# Patient Record
Sex: FEMALE | Race: WHITE | NOT HISPANIC OR LATINO | Employment: FULL TIME | URBAN - METROPOLITAN AREA
[De-identification: names, ages, dates, MRNs, and addresses within clinical notes are randomized per-mention and may not be internally consistent; named-entity substitution may affect disease eponyms.]

---

## 2017-08-17 ENCOUNTER — HOSPITAL ENCOUNTER (OUTPATIENT)
Dept: RADIOLOGY | Facility: HOSPITAL | Age: 52
Discharge: HOME | End: 2017-08-17
Attending: NURSE PRACTITIONER
Payer: COMMERCIAL

## 2017-08-17 ENCOUNTER — OFFICE VISIT (OUTPATIENT)
Dept: FAMILY MEDICINE | Facility: CLINIC | Age: 52
End: 2017-08-17
Payer: COMMERCIAL

## 2017-08-17 VITALS
RESPIRATION RATE: 20 BRPM | BODY MASS INDEX: 21.57 KG/M2 | SYSTOLIC BLOOD PRESSURE: 154 MMHG | HEART RATE: 55 BPM | DIASTOLIC BLOOD PRESSURE: 94 MMHG | OXYGEN SATURATION: 99 % | HEIGHT: 58 IN | TEMPERATURE: 98 F | WEIGHT: 102.75 LBS

## 2017-08-17 DIAGNOSIS — M54.2 ACUTE NECK PAIN: ICD-10-CM

## 2017-08-17 DIAGNOSIS — K59.00 CONSTIPATION, UNSPECIFIED CONSTIPATION TYPE: ICD-10-CM

## 2017-08-17 DIAGNOSIS — J30.89 CHRONIC NON-SEASONAL ALLERGIC RHINITIS, UNSPECIFIED TRIGGER: ICD-10-CM

## 2017-08-17 DIAGNOSIS — Z11.59 NEED FOR HEPATITIS C SCREENING TEST: ICD-10-CM

## 2017-08-17 DIAGNOSIS — R20.8 BURNING SENSATION OF FEET: ICD-10-CM

## 2017-08-17 DIAGNOSIS — R53.83 FATIGUE, UNSPECIFIED TYPE: ICD-10-CM

## 2017-08-17 DIAGNOSIS — Z78.0 POSTMENOPAUSAL: ICD-10-CM

## 2017-08-17 DIAGNOSIS — R03.0 ELEVATED BLOOD PRESSURE READING: ICD-10-CM

## 2017-08-17 DIAGNOSIS — J34.89 NASAL SORE: ICD-10-CM

## 2017-08-17 DIAGNOSIS — E78.2 MIXED HYPERLIPIDEMIA: ICD-10-CM

## 2017-08-17 DIAGNOSIS — M54.2 ACUTE NECK PAIN: Primary | ICD-10-CM

## 2017-08-17 PROCEDURE — 72050 X-RAY EXAM NECK SPINE 4/5VWS: CPT | Mod: 26,,, | Performed by: RADIOLOGY

## 2017-08-17 PROCEDURE — 99999 PR PBB SHADOW E&M-EST. PATIENT-LVL IV: CPT | Mod: PBBFAC,,, | Performed by: NURSE PRACTITIONER

## 2017-08-17 PROCEDURE — 3008F BODY MASS INDEX DOCD: CPT | Mod: S$GLB,,, | Performed by: NURSE PRACTITIONER

## 2017-08-17 PROCEDURE — 72050 X-RAY EXAM NECK SPINE 4/5VWS: CPT | Mod: TC,PO

## 2017-08-17 PROCEDURE — 99204 OFFICE O/P NEW MOD 45 MIN: CPT | Mod: S$GLB,,, | Performed by: NURSE PRACTITIONER

## 2017-08-17 RX ORDER — ESTRADIOL 0.5 MG/1
0.5 TABLET ORAL DAILY
Refills: 3 | COMMUNITY
Start: 2017-07-14 | End: 2017-09-18

## 2017-08-17 RX ORDER — TIZANIDINE HYDROCHLORIDE 6 MG/1
6 CAPSULE, GELATIN COATED ORAL 3 TIMES DAILY PRN
Qty: 60 CAPSULE | Refills: 0 | Status: SHIPPED | OUTPATIENT
Start: 2017-08-17 | End: 2017-08-28

## 2017-08-17 RX ORDER — LORATADINE 10 MG/1
10 TABLET ORAL DAILY
COMMUNITY
End: 2018-08-07

## 2017-08-17 RX ORDER — MUPIROCIN 20 MG/G
OINTMENT TOPICAL 3 TIMES DAILY
Qty: 1 TUBE | Refills: 1 | Status: SHIPPED | OUTPATIENT
Start: 2017-08-17 | End: 2018-08-07

## 2017-08-17 NOTE — PROGRESS NOTES
Subjective:       Patient ID: Arlen Rosen is a 51 y.o. female.    Chief Complaint: Neck Pain; Constipation; Fatigue; Weight Gain; Foot Problem (itching/burning); Headache; Insomnia; Dizziness; Nose Problem (sores on and in nose-no drainage); and Lack Of Concentration    Mrs. Rosen is a new patient to family practice and myself. She presents today with multiple complaints.     Constipation: She reports last colonoscopy 3 years ago showed diverticulosis. She denies diverticulitis flares. She reports she stays very hydrated. She exercises daily. She reports metamucil makes her bloated. She has tried prune juice with relief of constipation. She has increased fiber in diet and has been having more regular BMs    Neck Pain    This is a new problem. The current episode started more than 1 month ago. The problem occurs constantly. The problem has been waxing and waning. The pain is present in the left side. The quality of the pain is described as aching. The pain is at a severity of 5/10. The pain is moderate. The symptoms are aggravated by position and twisting. The pain is worse during the day. Associated symptoms include headaches. Pertinent negatives include no chest pain, fever, numbness, pain with swallowing, paresis, tingling, trouble swallowing, visual change or weakness. She has tried ice for the symptoms. The treatment provided moderate relief.     Headache: She has suffered from migraine headaches in past however she reports she believes headache is related to neck pain, unlike typical migraine pattern. Negative for nausea/vomiting/photophobia.     Foot pain: She reports burning pain to bilateral feet every evening (plantar). She reports she has had plantar fasciiatis in past and this pain is different. She reports massage moderately relieves pain.       Postmenopausal: She reports many of her problems have started since total hysterectomy. +generalized fatigue and insomnia. She is on estrace 0.5mg daily;  she was on a higher dose and decreased due to breast tenderness.     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 Will continue to check at work. She reports she feels very overwhelmed and upset today.   Vitals:    08/17/17 1058   BP: (!) 154/94   Pulse: (!) 55   Resp: 20   Temp: 98.3 °F (36.8 °C)     Review of Systems   Constitutional: Positive for fatigue. Negative for chills, diaphoresis and fever.   HENT: Positive for rhinorrhea. Negative for facial swelling and trouble swallowing.         +nasal sores   Eyes: Negative.  Negative for discharge and redness.   Respiratory: Negative.  Negative for cough and shortness of breath.    Cardiovascular: Negative.  Negative for chest pain and palpitations.   Gastrointestinal: Positive for constipation. Negative for abdominal pain and vomiting.   Genitourinary: Negative.  Negative for difficulty urinating and flank pain.   Musculoskeletal: Positive for neck pain. Negative for back pain.   Skin: Negative.  Negative for rash and wound.   Neurological: Positive for headaches. Negative for dizziness, tingling, weakness, light-headedness and numbness.        +Burning sensation to bottom of feet (nightly)   Hematological: Negative.    Psychiatric/Behavioral: Positive for sleep disturbance. Negative for confusion. The patient is not nervous/anxious.        No past medical history on file.  Objective:      Physical Exam   Constitutional: She is oriented to person, place, and time. She appears well-developed and well-nourished.  Non-toxic appearance. She does not have a sickly appearance. She does not appear ill. No distress.   HENT:   Head: Normocephalic and atraumatic.   Right Ear: Hearing, tympanic membrane, external ear and ear canal normal.   Left Ear: Hearing, tympanic membrane, external ear and ear canal normal.   Nose: Rhinorrhea present.   Mouth/Throat: Uvula is midline, oropharynx is clear and moist  and mucous membranes are normal.   Eyes: Conjunctivae, EOM and lids are normal. Right eye exhibits no discharge. Left eye exhibits no discharge.   Neck: Trachea normal and normal range of motion. Neck supple. No JVD present. Muscular tenderness present. No spinous process tenderness present. No neck rigidity. No tracheal deviation, no edema, no erythema and normal range of motion present.   Cardiovascular: Normal rate, regular rhythm, S1 normal, S2 normal and normal heart sounds.   No extrasystoles are present. Exam reveals no gallop, no S3, no S4, no distant heart sounds and no friction rub.    No murmur heard.  Pulses:       Dorsalis pedis pulses are 2+ on the right side, and 2+ on the left side.        Posterior tibial pulses are 2+ on the right side, and 2+ on the left side.   Negative for lower extremity edema   Pulmonary/Chest: Effort normal and breath sounds normal. No accessory muscle usage. No respiratory distress. She has no decreased breath sounds. She has no wheezes. She has no rhonchi. She has no rales. She exhibits no tenderness.   Abdominal: Soft. Normal appearance and bowel sounds are normal. She exhibits no distension. There is no hepatosplenomegaly. There is no tenderness. No hernia.   Musculoskeletal: Normal range of motion. She exhibits no edema or deformity.        Cervical back: She exhibits pain. She exhibits no swelling, no edema, no deformity and no laceration.   Feet:   Right Foot:   Skin Integrity: Negative for ulcer, blister, skin breakdown, erythema, warmth, callus or dry skin.   Left Foot:   Skin Integrity: Negative for ulcer, blister, skin breakdown, erythema, warmth, callus or dry skin.   Neurological: She is alert and oriented to person, place, and time. No sensory deficit. Coordination and gait normal.   Skin: Skin is warm, dry and intact. She is not diaphoretic. No pallor.   Psychiatric: She has a normal mood and affect. Her speech is normal and behavior is normal. Judgment and  thought content normal. Cognition and memory are normal.   Nursing note and vitals reviewed.      Assessment:       1. Acute neck pain    2. Need for hepatitis C screening test    3. Fatigue, unspecified type    4. Mixed hyperlipidemia    5. Postmenopausal    6. Burning sensation of feet    7. Elevated blood pressure reading    8. Chronic non-seasonal allergic rhinitis, unspecified trigger    9. Nasal sore        Plan:       Acute neck pain  -     X-Ray Cervical Spine Complete 5 view; Future; Expected date: 08/17/2017  -     Ambulatory referral to Gynecology  -     tizanidine (ZANAFLEX) 6 mg capsule; Take 1 capsule (6 mg total) by mouth 3 (three) times daily as needed for Muscle spasms.  Dispense: 60 capsule; Refill: 0    Need for hepatitis C screening test  -     Hepatitis C antibody; Future; Expected date: 08/17/2017    Fatigue, unspecified type  -     TSH; Future; Expected date: 08/17/2017  -     T4, free; Future; Expected date: 08/17/2017    Mixed hyperlipidemia  -     Lipid panel; Future; Expected date: 08/17/2017    Postmenopausal  -     Vitamin D; Future; Expected date: 08/17/2017    Burning sensation of feet  -     Comprehensive metabolic panel; Future; Expected date: 08/17/2017  -     Hemoglobin A1c; Future; Expected date: 08/17/2017    Elevated blood pressure reading  -     CBC auto differential; Future; Expected date: 08/17/2017    Chronic non-seasonal allergic rhinitis, unspecified trigger  Nasal sore  -     mupirocin (BACTROBAN) 2 % ointment; Apply topically 3 (three) times daily.  Dispense: 1 Tube; Refill: 1  - otc flonase, claritin     Constipation   Continue diet modifications as now; push fluids, high fibers, regular exercise    Explained to patient I believe a lot of her symptoms seem hormonal related; Will refer to gynecology for specialized treatment plan.     Xray today, labs tomorrow AM fasting       Follow up to establish care   Return if symptoms worsen or fail to improve.

## 2017-08-18 ENCOUNTER — LAB VISIT (OUTPATIENT)
Dept: LAB | Facility: HOSPITAL | Age: 52
End: 2017-08-18
Attending: NURSE PRACTITIONER
Payer: COMMERCIAL

## 2017-08-18 ENCOUNTER — TELEPHONE (OUTPATIENT)
Dept: FAMILY MEDICINE | Facility: CLINIC | Age: 52
End: 2017-08-18

## 2017-08-18 DIAGNOSIS — Z78.0 POSTMENOPAUSAL: ICD-10-CM

## 2017-08-18 DIAGNOSIS — M48.02 FORAMINAL STENOSIS OF CERVICAL REGION: Primary | ICD-10-CM

## 2017-08-18 DIAGNOSIS — R53.83 FATIGUE, UNSPECIFIED TYPE: ICD-10-CM

## 2017-08-18 DIAGNOSIS — R03.0 ELEVATED BLOOD PRESSURE READING: ICD-10-CM

## 2017-08-18 DIAGNOSIS — E78.2 MIXED HYPERLIPIDEMIA: ICD-10-CM

## 2017-08-18 DIAGNOSIS — R20.8 BURNING SENSATION OF FEET: ICD-10-CM

## 2017-08-18 DIAGNOSIS — Z11.59 NEED FOR HEPATITIS C SCREENING TEST: ICD-10-CM

## 2017-08-18 LAB
25(OH)D3+25(OH)D2 SERPL-MCNC: 27 NG/ML
ALBUMIN SERPL BCP-MCNC: 3.9 G/DL
ALP SERPL-CCNC: 71 U/L
ALT SERPL W/O P-5'-P-CCNC: 16 U/L
ANION GAP SERPL CALC-SCNC: 10 MMOL/L
AST SERPL-CCNC: 21 U/L
BASOPHILS # BLD AUTO: 0.02 K/UL
BASOPHILS NFR BLD: 0.3 %
BILIRUB SERPL-MCNC: 0.9 MG/DL
BUN SERPL-MCNC: 11 MG/DL
CALCIUM SERPL-MCNC: 9.9 MG/DL
CHLORIDE SERPL-SCNC: 105 MMOL/L
CHOLEST/HDLC SERPL: 4.2 {RATIO}
CO2 SERPL-SCNC: 25 MMOL/L
CREAT SERPL-MCNC: 0.8 MG/DL
DIFFERENTIAL METHOD: NORMAL
EOSINOPHIL # BLD AUTO: 0.1 K/UL
EOSINOPHIL NFR BLD: 2 %
ERYTHROCYTE [DISTWIDTH] IN BLOOD BY AUTOMATED COUNT: 12.5 %
EST. GFR  (AFRICAN AMERICAN): >60 ML/MIN/1.73 M^2
EST. GFR  (NON AFRICAN AMERICAN): >60 ML/MIN/1.73 M^2
ESTIMATED AVG GLUCOSE: 91 MG/DL
GLUCOSE SERPL-MCNC: 86 MG/DL
HBA1C MFR BLD HPLC: 4.8 %
HCT VFR BLD AUTO: 41.6 %
HCV AB SERPL QL IA: NEGATIVE
HDL/CHOLESTEROL RATIO: 23.9 %
HDLC SERPL-MCNC: 243 MG/DL
HDLC SERPL-MCNC: 58 MG/DL
HGB BLD-MCNC: 14.4 G/DL
LDLC SERPL CALC-MCNC: 152.2 MG/DL
LYMPHOCYTES # BLD AUTO: 3 K/UL
LYMPHOCYTES NFR BLD: 45 %
MCH RBC QN AUTO: 30.9 PG
MCHC RBC AUTO-ENTMCNC: 34.6 G/DL
MCV RBC AUTO: 89 FL
MONOCYTES # BLD AUTO: 0.4 K/UL
MONOCYTES NFR BLD: 5.7 %
NEUTROPHILS # BLD AUTO: 3.1 K/UL
NEUTROPHILS NFR BLD: 46.8 %
NONHDLC SERPL-MCNC: 185 MG/DL
PLATELET # BLD AUTO: 172 K/UL
PMV BLD AUTO: 12.8 FL
POTASSIUM SERPL-SCNC: 4.3 MMOL/L
PROT SERPL-MCNC: 7.1 G/DL
RBC # BLD AUTO: 4.66 M/UL
SODIUM SERPL-SCNC: 140 MMOL/L
T4 FREE SERPL-MCNC: 0.99 NG/DL
TRIGL SERPL-MCNC: 164 MG/DL
TSH SERPL DL<=0.005 MIU/L-ACNC: 1.88 UIU/ML
WBC # BLD AUTO: 6.62 K/UL

## 2017-08-18 PROCEDURE — 85025 COMPLETE CBC W/AUTO DIFF WBC: CPT

## 2017-08-18 PROCEDURE — 80061 LIPID PANEL: CPT

## 2017-08-18 PROCEDURE — 84439 ASSAY OF FREE THYROXINE: CPT

## 2017-08-18 PROCEDURE — 84443 ASSAY THYROID STIM HORMONE: CPT

## 2017-08-18 PROCEDURE — 36415 COLL VENOUS BLD VENIPUNCTURE: CPT | Mod: PO

## 2017-08-18 PROCEDURE — 86803 HEPATITIS C AB TEST: CPT

## 2017-08-18 PROCEDURE — 83036 HEMOGLOBIN GLYCOSYLATED A1C: CPT

## 2017-08-18 PROCEDURE — 82306 VITAMIN D 25 HYDROXY: CPT

## 2017-08-18 PROCEDURE — 80053 COMPREHEN METABOLIC PANEL: CPT

## 2017-08-28 ENCOUNTER — INITIAL CONSULT (OUTPATIENT)
Dept: OBSTETRICS AND GYNECOLOGY | Facility: CLINIC | Age: 52
End: 2017-08-28
Payer: COMMERCIAL

## 2017-08-28 VITALS
DIASTOLIC BLOOD PRESSURE: 60 MMHG | HEIGHT: 58 IN | WEIGHT: 102.31 LBS | SYSTOLIC BLOOD PRESSURE: 100 MMHG | BODY MASS INDEX: 21.48 KG/M2

## 2017-08-28 DIAGNOSIS — N95.1 MENOPAUSAL SYMPTOMS: ICD-10-CM

## 2017-08-28 DIAGNOSIS — N39.3 SUI (STRESS URINARY INCONTINENCE, FEMALE): Primary | ICD-10-CM

## 2017-08-28 PROCEDURE — 3008F BODY MASS INDEX DOCD: CPT | Mod: S$GLB,,, | Performed by: OBSTETRICS & GYNECOLOGY

## 2017-08-28 PROCEDURE — 99999 PR PBB SHADOW E&M-EST. PATIENT-LVL III: CPT | Mod: PBBFAC,,, | Performed by: OBSTETRICS & GYNECOLOGY

## 2017-08-28 PROCEDURE — 99203 OFFICE O/P NEW LOW 30 MIN: CPT | Mod: S$GLB,,, | Performed by: OBSTETRICS & GYNECOLOGY

## 2017-08-28 RX ORDER — PROGESTERONE 100 MG/1
100 CAPSULE ORAL NIGHTLY
Qty: 30 CAPSULE | Refills: 5 | Status: SHIPPED | OUTPATIENT
Start: 2017-08-28 | End: 2018-08-07

## 2017-08-28 RX ORDER — ESTERIFIED ESTROGEN AND METHYLTESTOSTERONE .625; 1.25 MG/1; MG/1
1 TABLET ORAL DAILY
Qty: 30 TABLET | Refills: 5 | Status: SHIPPED | OUTPATIENT
Start: 2017-08-28 | End: 2021-01-21

## 2017-08-28 RX ORDER — CHOLECALCIFEROL (VITAMIN D3) 25 MCG
2000 TABLET ORAL DAILY
COMMUNITY
End: 2018-08-07

## 2017-08-28 RX ORDER — CLOBETASOL PROPIONATE 0.5 MG/G
CREAM TOPICAL 2 TIMES DAILY
COMMUNITY

## 2017-08-28 NOTE — LETTER
August 28, 2017      Alice Haas NP  [address] [facility] [address] [place] [address]           [facility] [address]  [facility] [address]  Phone: [phone]  Fax: [phone]          Patient: Arlen Rosen   MR Number: [id number]   YOB: 1965   Date of Visit: 8/28/2017       Dear Alice Haas:    Thank you for referring Arlen Rosen to me for evaluation. Attached you will find relevant portions of my assessment and plan of care.    If you have questions, please do not hesitate to call me. I look forward to following Arlen Rosen along with you.    Sincerely,    Andre Valentin MD    Enclosure  CC:  No Recipients    If you would like to receive this communication electronically, please contact [email or web address] or [phone] to request more information on [facility] Link access.    For providers and/or their staff who would like to refer a patient to [facility], please contact us through our one-stop-shop provider referral line, [facility], at [phone].    If you feel you have received this communication in error or would no longer like to receive these types of communications, please e-mail [email or web address]

## 2017-08-28 NOTE — PROGRESS NOTES
Patient here today 3 years after hysterectomy with BSO, estrace .5 qd, continued menopausal symptoms but unable to tolerate higher estrogen dose because of breast soreness. Counseled on Risks, Benefits and Alternatives to progesterone and estratest , discused with patient in detail, all questions answered and patient agreed to proceed.   also stress urinary incontinence - daily problem, counseled - kegals and estrogen    15 minutes spent with patient with > 1/2 time in counseling.      Plan:  1. estratest HS, prometrium 100 qhs  2. Follow up 2-3 weeks to asses response  3. TVT at her convenience

## 2017-09-18 ENCOUNTER — OFFICE VISIT (OUTPATIENT)
Dept: OBSTETRICS AND GYNECOLOGY | Facility: CLINIC | Age: 52
End: 2017-09-18
Payer: COMMERCIAL

## 2017-09-18 VITALS — BODY MASS INDEX: 21.61 KG/M2 | HEIGHT: 58 IN | WEIGHT: 102.94 LBS

## 2017-09-18 DIAGNOSIS — N39.3 SUI (STRESS URINARY INCONTINENCE, FEMALE): Primary | ICD-10-CM

## 2017-09-18 DIAGNOSIS — N95.1 MENOPAUSAL SYMPTOMS: ICD-10-CM

## 2017-09-18 PROCEDURE — 99213 OFFICE O/P EST LOW 20 MIN: CPT | Mod: S$GLB,,, | Performed by: OBSTETRICS & GYNECOLOGY

## 2017-09-18 PROCEDURE — 99999 PR PBB SHADOW E&M-EST. PATIENT-LVL II: CPT | Mod: PBBFAC,,, | Performed by: OBSTETRICS & GYNECOLOGY

## 2017-09-18 PROCEDURE — 3008F BODY MASS INDEX DOCD: CPT | Mod: S$GLB,,, | Performed by: OBSTETRICS & GYNECOLOGY

## 2017-09-18 NOTE — PROGRESS NOTES
Here for meds follow up, reports allergic reaction from oral flagyl- PAP with BV from primary care- counseled.     LAST VISIT:   3 years after hysterectomy with BSO, estrace .5 qd, continued menopausal symptoms but unable to tolerate higher estrogen dose because of breast soreness. Counseled on Risks, Benefits and Alternatives to progesterone and estratest , discused with patient in detail, all questions answered and patient agreed to proceed.   also stress urinary incontinence - daily problem, counseled - kegals and estrogen      She reports symptoms from menopause completely improved on meds, sleeping well.     15 minutes spent with patient with > 1/2 time in counseling.       Plan:  Continue estratest HS, prometrium 100 qhs, follow up as needed for cystometrics and to schedule TVT at her convenience

## 2018-08-07 ENCOUNTER — LAB VISIT (OUTPATIENT)
Dept: LAB | Facility: HOSPITAL | Age: 53
End: 2018-08-07
Attending: NURSE PRACTITIONER
Payer: COMMERCIAL

## 2018-08-07 ENCOUNTER — OFFICE VISIT (OUTPATIENT)
Dept: FAMILY MEDICINE | Facility: CLINIC | Age: 53
End: 2018-08-07
Payer: COMMERCIAL

## 2018-08-07 VITALS
SYSTOLIC BLOOD PRESSURE: 124 MMHG | HEART RATE: 55 BPM | OXYGEN SATURATION: 97 % | BODY MASS INDEX: 21.15 KG/M2 | HEIGHT: 58 IN | WEIGHT: 100.75 LBS | TEMPERATURE: 98 F | DIASTOLIC BLOOD PRESSURE: 68 MMHG

## 2018-08-07 DIAGNOSIS — K85.90 ACUTE PANCREATITIS WITHOUT INFECTION OR NECROSIS, UNSPECIFIED PANCREATITIS TYPE: ICD-10-CM

## 2018-08-07 DIAGNOSIS — Z09 HOSPITAL DISCHARGE FOLLOW-UP: Primary | ICD-10-CM

## 2018-08-07 LAB
ALBUMIN SERPL BCP-MCNC: 3.9 G/DL
ALP SERPL-CCNC: 97 U/L
ALT SERPL W/O P-5'-P-CCNC: 18 U/L
AMYLASE SERPL-CCNC: 57 U/L
ANION GAP SERPL CALC-SCNC: 8 MMOL/L
AST SERPL-CCNC: 17 U/L
BILIRUB SERPL-MCNC: 0.5 MG/DL
BUN SERPL-MCNC: 12 MG/DL
CALCIUM SERPL-MCNC: 10.8 MG/DL
CHLORIDE SERPL-SCNC: 99 MMOL/L
CO2 SERPL-SCNC: 31 MMOL/L
CREAT SERPL-MCNC: 0.8 MG/DL
EST. GFR  (AFRICAN AMERICAN): >60 ML/MIN/1.73 M^2
EST. GFR  (NON AFRICAN AMERICAN): >60 ML/MIN/1.73 M^2
GLUCOSE SERPL-MCNC: 109 MG/DL
LIPASE SERPL-CCNC: 79 U/L
POTASSIUM SERPL-SCNC: 3.6 MMOL/L
PROT SERPL-MCNC: 7.8 G/DL
SODIUM SERPL-SCNC: 138 MMOL/L

## 2018-08-07 PROCEDURE — 99999 PR PBB SHADOW E&M-EST. PATIENT-LVL IV: CPT | Mod: PBBFAC,,, | Performed by: NURSE PRACTITIONER

## 2018-08-07 PROCEDURE — 82150 ASSAY OF AMYLASE: CPT

## 2018-08-07 PROCEDURE — 80053 COMPREHEN METABOLIC PANEL: CPT

## 2018-08-07 PROCEDURE — 99214 OFFICE O/P EST MOD 30 MIN: CPT | Mod: S$GLB,,, | Performed by: NURSE PRACTITIONER

## 2018-08-07 PROCEDURE — 83690 ASSAY OF LIPASE: CPT

## 2018-08-07 PROCEDURE — 36415 COLL VENOUS BLD VENIPUNCTURE: CPT | Mod: PO

## 2018-08-07 RX ORDER — AZELASTINE 1 MG/ML
SPRAY, METERED NASAL
COMMUNITY
Start: 2018-07-12

## 2018-08-07 RX ORDER — OMEPRAZOLE 20 MG/1
20 CAPSULE, DELAYED RELEASE ORAL DAILY
COMMUNITY
Start: 2018-08-04 | End: 2021-01-21

## 2018-08-07 RX ORDER — FLUTICASONE PROPIONATE 50 MCG
SPRAY, SUSPENSION (ML) NASAL
COMMUNITY
Start: 2018-07-14

## 2018-08-07 RX ORDER — MINERAL OIL
180 ENEMA (ML) RECTAL DAILY
COMMUNITY
Start: 2018-07-14

## 2018-08-07 NOTE — PROGRESS NOTES
Subjective:       Patient ID: Arlen Rosen is a 52 y.o. female.    Chief Complaint: Hospital Follow Up ([facility] )    Ms. Rosen is a known patient to me. She presents today for hospital follow up    HPI   Admitted to [facility] on for acute pancreatitis with associated ileus. Records reviewed-- She was placed on bowel rest and given IVF. Diet slowly advised. Workup for pancreatitis (US abdomen and lipid panel) unremarkable--antibody test for autoimmune pancreatitis sent off (pending at time of DC). Advised to FU with GI and follow low fat diet.  DCd home 8/4/18/    She denies any change in diet. Denies excessive alcohol intake. Eats low fat/clean diet. Does report change in bowel habit prior to pancreatitis episode (gassy/diarrhea). 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   Vitals:    08/07/18 1634   BP: 124/68   Pulse: (!) 55   Temp: 98.1 °F (36.7 °C)     Review of Systems   Constitutional: Negative for diaphoresis and fever.   HENT: Negative for facial swelling and trouble swallowing.    Eyes: Negative for discharge and redness.   Respiratory: Negative for cough and shortness of breath.    Cardiovascular: Negative for chest pain and palpitations.   Gastrointestinal: Positive for abdominal pain, constipation and diarrhea.   Genitourinary: Negative for difficulty urinating.   Musculoskeletal: Negative for gait problem.   Skin: Negative for rash and wound.   Neurological: Positive for headaches. Negative for facial asymmetry and speech difficulty.   Psychiatric/Behavioral: Negative for confusion. The patient is not nervous/anxious.        Past Medical History:   Diagnosis Date    Lichen sclerosus et atrophicus of the vulva      Objective:      Physical Exam   Constitutional: She is oriented to person, place, and time. She does not have a sickly appearance. No distress.   HENT:   Head: Normocephalic.   Right Ear: Hearing normal.   Left Ear: Hearing normal.   Nose: Nose normal.   Eyes: Conjunctivae and lids are  normal.   Neck: No JVD present. No tracheal deviation present.   Cardiovascular: Normal rate and normal heart sounds.    Pulmonary/Chest: Effort normal and breath sounds normal.   Abdominal: Normal appearance. She exhibits distension. There is no tenderness.   Musculoskeletal: She exhibits no deformity.   Neurological: She is alert and oriented to person, place, and time.   Skin: She is not diaphoretic. No pallor.   Psychiatric: She has a normal mood and affect. Her speech is normal and behavior is normal. Judgment and thought content normal. Cognition and memory are normal.   Nursing note and vitals reviewed.      Assessment:       1. Hospital discharge follow-up    2. Acute pancreatitis without infection or necrosis, unspecified pancreatitis type        Plan:       Hospital discharge follow-up    Acute pancreatitis without infection or necrosis, unspecified pancreatitis type  -     Amylase; Future; Expected date: 08/07/2018  -     Lipase; Future; Expected date: 08/07/2018  -     Comprehensive metabolic panel; Future; Expected date: 08/07/2018  -     Ambulatory referral to Gastroenterology  - Low fat diet    Transitional Care Note    Family and/or Caretaker present at visit?  No  Diagnostic tests reviewed/disposition: yes; repeat labs, FU c GI  Disease/illness education: low fat diet  Home health/community services discussion/referrals: NA  Establishment or re-establishment of referral orders for community resources: NA  Discussion with other health care providers: FU c GI    Follow-up for GI evaluation, with me as needed, PCP routinely .

## 2018-08-07 NOTE — LETTER
August 7, 2018      [facility]  [address] [facility] [address] [place] [address]  Phone: [phone]  Fax: [phone]       Patient: Arlen Rosen   YOB: 1965  Date of Visit: 08/07/2018    To Whom It May Concern:    Marcelino Rosen  was at [facility] on 08/07/2018. She may return to work on 8/11/18 with no restrictions.  If you have any questions or concerns, or if I can be of further assistance, please do not hesitate to contact me.    Sincerely,    Alice Harden NP

## 2018-08-19 ENCOUNTER — PATIENT MESSAGE (OUTPATIENT)
Dept: GASTROENTEROLOGY | Facility: CLINIC | Age: 53
End: 2018-08-19

## 2018-08-20 ENCOUNTER — INITIAL CONSULT (OUTPATIENT)
Dept: GASTROENTEROLOGY | Facility: CLINIC | Age: 53
End: 2018-08-20
Payer: COMMERCIAL

## 2018-08-20 ENCOUNTER — TELEPHONE (OUTPATIENT)
Dept: GASTROENTEROLOGY | Facility: CLINIC | Age: 53
End: 2018-08-20

## 2018-08-20 ENCOUNTER — HOSPITAL ENCOUNTER (OUTPATIENT)
Dept: RADIOLOGY | Facility: HOSPITAL | Age: 53
Discharge: HOME | End: 2018-08-20
Attending: NURSE PRACTITIONER
Payer: COMMERCIAL

## 2018-08-20 VITALS
BODY MASS INDEX: 20.82 KG/M2 | HEIGHT: 58 IN | HEART RATE: 59 BPM | RESPIRATION RATE: 18 BRPM | WEIGHT: 99.19 LBS | SYSTOLIC BLOOD PRESSURE: 119 MMHG | DIASTOLIC BLOOD PRESSURE: 73 MMHG

## 2018-08-20 DIAGNOSIS — R19.5 CHANGE IN STOOL CALIBER: ICD-10-CM

## 2018-08-20 DIAGNOSIS — R14.0 ABDOMINAL BLOATING: ICD-10-CM

## 2018-08-20 DIAGNOSIS — Z80.0 FAMILY HISTORY OF COLON CANCER: ICD-10-CM

## 2018-08-20 DIAGNOSIS — R10.30 LOWER ABDOMINAL PAIN: ICD-10-CM

## 2018-08-20 DIAGNOSIS — Z87.19 HISTORY OF PANCREATITIS: ICD-10-CM

## 2018-08-20 DIAGNOSIS — Z87.19 HISTORY OF ILEUS: ICD-10-CM

## 2018-08-20 DIAGNOSIS — Z87.19 HISTORY OF CHRONIC CONSTIPATION: ICD-10-CM

## 2018-08-20 DIAGNOSIS — Z87.42 HISTORY OF ENDOMETRIOSIS: ICD-10-CM

## 2018-08-20 DIAGNOSIS — R10.30 LOWER ABDOMINAL PAIN: Primary | ICD-10-CM

## 2018-08-20 PROCEDURE — 99243 OFF/OP CNSLTJ NEW/EST LOW 30: CPT | Mod: S$GLB,,, | Performed by: NURSE PRACTITIONER

## 2018-08-20 PROCEDURE — 99999 PR PBB SHADOW E&M-EST. PATIENT-LVL IV: CPT | Mod: PBBFAC,,, | Performed by: NURSE PRACTITIONER

## 2018-08-20 PROCEDURE — 74019 RADEX ABDOMEN 2 VIEWS: CPT | Mod: 26,,, | Performed by: RADIOLOGY

## 2018-08-20 PROCEDURE — 74019 RADEX ABDOMEN 2 VIEWS: CPT | Mod: TC,FY,PO

## 2018-08-20 NOTE — LETTER
August 20, 2018      Alice Harden, JORGE  [address] [facility] [place] [address]           [facility]  [address] [facility] [place] [address]  Phone: [phone]          Patient: Arlen Rosen   MR Number: [id number]   YOB: 1965   Date of Visit: 8/20/2018       Dear Alice Harden:    Thank you for referring Arlen Rosen to me for evaluation. Attached you will find relevant portions of my assessment and plan of care.    If you have questions, please do not hesitate to call me. I look forward to following Arlen Rosen along with you.    Sincerely,    Jesusita Cash, [facility]    Enclosure  CC:  No Recipients    If you would like to receive this communication electronically, please contact [email or web address] or [phone] to request more information on [employer] Link access.    For providers and/or their staff who would like to refer a patient to [facility], please contact us through our one-stop-shop provider referral line, [facility], at [phone].    If you feel you have received this communication in error or would no longer like to receive these types of communications, please e-mail [email or web address]

## 2018-08-20 NOTE — PROGRESS NOTES
"Subjective:       Patient ID: Arlen Rosen is a 52 y.o. female Body mass index is 20.73 kg/m².    Chief Complaint: Hospital Follow Up (pancreatitis)    This patient is new to me.  Referring Provider: FABY Harden NP for acute pancreatitis    Reviewed 8/7/18 visit note with FABY Harden NP: "HPI   Admitted to [facility] on for acute pancreatitis with associated ileus. Records reviewed-- She was placed on bowel rest and given IVF. Diet slowly advised. Workup for pancreatitis (US abdomen and lipid panel) unremarkable--antibody test for autoimmune pancreatitis sent off (pending at time of DC). Advised to FU with GI and follow low fat diet.  DCd home 8/4/18/  She denies any change in diet. Denies excessive alcohol intake. Eats low fat/clean diet. Does report change in bowel habit prior to pancreatitis episode (gassy/diarrhea). 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 "      Abdominal Pain   This is a new problem. Episode onset: started with diarrhea episodes lasting 4 hours in late 7/2018; prior week went to the beach (denies any alcohol use), felt "terrible after her beach trip"; started with chest pain & abdominal pain 8/1/18. The problem occurs 2 to 4 times per day (mostly in the morning). The most recent episode lasted 1 hour. Progression since onset: improved after hospitalization; but still having intermittent abdominal pain. The pain is located in the generalized abdominal region (now mostly lower abdomen). The pain is at a severity of 3/10. Quality: bloating. Associated symptoms include belching (frequent), constipation (chronic; bowel movements 2-4 times a week) and flatus (frequent). Pertinent negatives include no diarrhea (has resolved), dysuria, fever, frequency, hematochezia, melena, nausea, vomiting or weight loss. Associated symptoms comments: Narrow stool when symptoms first started. The pain is relieved by passing flatus, belching and bowel movements. She has tried proton pump inhibitors " (prilosec 20 mg once daily; denies alcohol use; denies any new medication; PAST: zantac helped) for the symptoms. Prior diagnostic workup includes CT scan, GI consult and ultrasound. Her past medical history is significant for abdominal surgery, GERD (denies any symptoms currently) and pancreatitis. There is no history of Crohn's disease, gallstones, irritable bowel syndrome, PUD or ulcerative colitis.     Review of Systems   Constitutional: Negative for appetite change (back to normal; eating low fat diet), chills, fatigue, fever and weight loss.   HENT: Negative for sore throat and trouble swallowing.    Respiratory: Negative for cough, choking and shortness of breath.    Cardiovascular: Negative for chest pain.   Gastrointestinal: Positive for abdominal pain, constipation (chronic; bowel movements 2-4 times a week) and flatus (frequent). Negative for anal bleeding, blood in stool, diarrhea (has resolved), hematochezia, melena, nausea, rectal pain and vomiting.   Genitourinary: Negative for difficulty urinating, dysuria, flank pain and frequency.   Neurological: Negative for weakness.       Past Medical History:   Diagnosis Date    [facility] pancreat[facility]    Lichen sclerosus et atrophicus of the vulva      Past Surgical History:   Procedure Laterality Date    COLONOSCOPY  2014    Dr. Shaver, normal findings per patient report    HYSTERECTOMY  07/2013    laparoscope      endometriosis    OOPHORECTOMY  07/2013    UPPER GASTROINTESTINAL ENDOSCOPY  05/25/2006    Dr. Haro, in legacy: unremarkable findings     Family History   Problem Relation Age of Onset    Breast cancer Maternal Grandmother 70    Colon polyps Brother     Colon cancer Paternal Grandfather     Crohn's disease Neg Hx     Ulcerative colitis Neg Hx     Stomach cancer Neg Hx     Esophageal cancer Neg Hx     Pancreatic cancer Neg Hx     Pancreatitis Neg Hx      Social History     Socioeconomic History    Marital status:       Spouse name: Not on file    Number of children: Not on file    Years of education: Not on file    Highest education level: Not on file   Social Needs    Financial resource strain: Not on file    Food insecurity - worry: Not on file    Food insecurity - inability: Not on file    Transportation needs - medical: Not on file    Transportation needs - non-medical: Not on file   Occupational History    Not on file   Tobacco Use    Smoking status: Never Smoker    Smokeless tobacco: Never Used   Substance and Sexual Activity    Alcohol use: Yes     Comment: just socially about once a month    Drug use: No    Sexual activity: Not on file   Other Topics Concern    Not on file   Social History Narrative    Not on file     Wt Readings from Last 10 Encounters:   08/20/18 45 kg (99 lb 3.3 oz)   08/07/18 45.7 kg (100 lb 12 oz)   09/18/17 46.7 kg (102 lb 15.3 oz)   08/28/17 46.4 kg (102 lb 4.7 oz)   08/17/17 46.6 kg (102 lb 11.8 oz)   08/03/17 43.1 kg (95 lb)   10/08/10 43.2 kg (95 lb 4.2 oz)     Lab Results   Component Value Date    WBC 6.62 08/18/2017    HGB 14.4 08/18/2017    HCT 41.6 08/18/2017    MCV 89 08/18/2017     08/18/2017     CMP  Sodium   Date Value Ref Range Status   08/07/2018 138 136 - 145 mmol/L Final     Potassium   Date Value Ref Range Status   08/07/2018 3.6 3.5 - 5.1 mmol/L Final     Chloride   Date Value Ref Range Status   08/07/2018 99 95 - 110 mmol/L Final     CO2   Date Value Ref Range Status   08/07/2018 31 (H) 23 - 29 mmol/L Final     Glucose   Date Value Ref Range Status   08/07/2018 109 70 - 110 mg/dL Final     BUN, Bld   Date Value Ref Range Status   08/07/2018 12 6 - 20 mg/dL Final     Creatinine   Date Value Ref Range Status   08/07/2018 0.8 0.5 - 1.4 mg/dL Final     Calcium   Date Value Ref Range Status   08/07/2018 10.8 (H) 8.7 - 10.5 mg/dL Final     Total Protein   Date Value Ref Range Status   08/07/2018 7.8 6.0 - 8.4 g/dL Final     Albumin   Date Value Ref  "Range Status   08/07/2018 3.9 3.5 - 5.2 g/dL Final     Total Bilirubin   Date Value Ref Range Status   08/07/2018 0.5 0.1 - 1.0 mg/dL Final     Comment:     For infants and newborns, interpretation of results should be based  on gestational age, weight and in agreement with clinical  observations.  Premature Infant recommended reference ranges:  Up to 24 hours.............<8.0 mg/dL  Up to 48 hours............<12.0 mg/dL  3-5 days..................<15.0 mg/dL  6-29 days.................<15.0 mg/dL       Alkaline Phosphatase   Date Value Ref Range Status   08/07/2018 97 55 - 135 U/L Final     AST   Date Value Ref Range Status   08/07/2018 17 10 - 40 U/L Final     ALT   Date Value Ref Range Status   08/07/2018 18 10 - 44 U/L Final     Anion Gap   Date Value Ref Range Status   08/07/2018 8 8 - 16 mmol/L Final     eGFR if    Date Value Ref Range Status   08/07/2018 >60.0 >60 mL/min/1.73 m^2 Final     eGFR if non    Date Value Ref Range Status   08/07/2018 >60.0 >60 mL/min/1.73 m^2 Final     Comment:     Calculation used to obtain the estimated glomerular filtration  rate (eGFR) is the CKD-EPI equation.        Lab Results   Component Value Date    AMYLASE 57 08/07/2018     Lab Results   Component Value Date    LIPASE 79 (H) 08/07/2018   "Notes recorded by Alice Harden NP on 8/8/2018 at 7:27 AM CDT  Please call and tell her lipase greatly improved (from >[phone] to [phone]). FU with GI as scheduled"    Lab Results   Component Value Date    TSH 1.882 08/18/2017     Reviewed medical records received from patient, summarized below and in medical history, & given to nurse to be scanned into system:   8/1/18 ct abdomen pelvis: "impression: acute pancreatitis without necrosis, abscess, pseudocyst. Gaseous distension of stomach and small bowel, likely a mild ileus." "there is no pancreatic or biliary ductal dilatation."  8/2/18 abdominal ultrasound "impression: edema in this pancreas and free " "fluid in the left and right anterior pararenal space could represent acute pancreatitis. No evidence of gallstones[.] right renal subcentimeter angiomyolipoma"    Objective:      Physical Exam   Constitutional: She is oriented to person, place, and time. She appears well-developed and well-nourished. No distress.   HENT:   Mouth/Throat: Oropharynx is clear and moist and mucous membranes are normal. No oral lesions. No oropharyngeal exudate.   Eyes: Conjunctivae are normal. Pupils are equal, round, and reactive to light. No scleral icterus.   Cardiovascular: Normal rate.   Pulmonary/Chest: Effort normal and breath sounds normal. No respiratory distress. She has no wheezes.   Abdominal: Bowel sounds are normal. She exhibits distension (mild). She exhibits no abdominal bruit and no mass. There is no hepatosplenomegaly. There is tenderness (mild) in the left lower quadrant. There is no rigidity, no rebound, no guarding, no tenderness at McBurney's point and negative Presley's sign. No hernia.   Neurological: She is alert and oriented to person, place, and time.   Skin: Skin is warm and dry. No rash noted. She is not diaphoretic. No erythema. No pallor.   Non-jaundiced   Psychiatric: She has a normal mood and affect. Her behavior is normal. Judgment and thought content normal.   Nursing note and vitals reviewed.      Assessment:       1. Lower abdominal pain    2. History of pancreatitis    3. History of endometriosis    4. History of chronic constipation    5. Family history of colon cancer    6. Change in stool caliber    7. History of ileus    8. Abdominal bloating        Plan:     Patient agreed to sign medical records release consent for us to obtain records from [facility]    Lower abdominal pain  -     X-Ray Abdomen Flat And Erect; Future; Expected date: 08/20/2018  -     Ambulatory referral to General Surgery  - schedule Colonoscopy to be done in 6-8 weeks, discussed procedure with the patient, patient verbalized " understanding    History of pancreatitis  -     Lipase; Future; Expected date: 08/20/2018  -     X-Ray Abdomen Flat And Erect; Future; Expected date: 08/20/2018  -     Ambulatory referral to General Surgery  - Possible EUS pending results of testing and if symptoms persist; will discuss case with Dr. Castorena and see what his recommendations are; patient verbalized understanding and agreed with management plan.    History of endometriosis  Recommend follow-up with GYN for continued evaluation and management.    History of chronic constipation  -     X-Ray Abdomen Flat And Erect; Future; Expected date: 08/20/2018  - schedule Colonoscopy to be done in 6-8 weeks, discussed procedure with the patient, patient verbalized understanding  Recommended daily exercise as tolerated, adequate water intake (six 8-oz glasses of water daily), and recommend trying OTC MiraLax once daily (17g PO) as directed pending results of x-ray  -If still no improvement with these measures, call/follow-up    Family history of colon cancer & Change in stool caliber  -     X-Ray Abdomen Flat And Erect; Future; Expected date: 08/20/2018  - schedule Colonoscopy to be done in 6-8 weeks, discussed procedure with the patient, patient verbalized understanding    History of ileus  -     X-Ray Abdomen Flat And Erect; Future; Expected date: 08/20/2018  - schedule Colonoscopy to be done in 6-8 weeks, discussed procedure with the patient, patient verbalized understanding    Abdominal bloating  -     X-Ray Abdomen Flat And Erect; Future; Expected date: 08/20/2018  - schedule Colonoscopy to be done in 6-8 weeks, discussed procedure with the patient, patient verbalized understanding  -discussed with patient about low gas diet: Reduce or eliminate these foods from your diet: Broccoli, Cauliflower, Callender sprouts, Cabbage, Cooked dried beans, Carbonated beverages (sparkling water, soda, beer, champagne)  Other Causes Of Excess Gas Include:   1) EATING TOO FAST or  TALKING WHILE YOU CHEW may cause you to swallow air. This increases the amount of gas in the stomach and may worsen your symptoms.  --> Chew each mouthful completely before swallowing. Take your time.  2) OVEREATING may increase the feeling of being bloated and cause more gas.  --> When you are full, stop eating.  3) CONSTIPATION can increase the amount of normal intestinal gas.    Follow-up in about 1 month (around 9/20/2018), or if symptoms worsen or fail to improve.      If no improvement in symptoms or symptoms worsen, call/follow-up at clinic or go to ER.

## 2018-08-20 NOTE — Clinical Note
Andrés Castorena,I saw this patient today. Just wanted to forward the progress note for your review and to see if you have any further recommendationsThJesusita de jesus

## 2018-08-20 NOTE — PATIENT INSTRUCTIONS
Discharge Instructions for Acute Pancreatitis  You have been diagnosed with acute pancreatitis. Your pancreas is inflamed or swollen. The pancreas is an organ that makes digestive juices and hormones. Gallstones are a common cause of pancreatitis. These hard stones form in the gallbladder. The gallbladder shares a tube with the pancreas into the small intestine. If gallstones block this tube, fluid cant leave the pancreas. The fluid backs up and causes redness and swelling (inflammation). There are other causes of pancreatitis. Make sure you understand the cause of your pancreatitis. Then you can try to stop it from happening again.  Immediate home care  · Find someone to drive you to appointments. Acute pancreatitis is a serious condition, and you should never drive if you are experiencing symptoms.  · Stop drinking if your illness was caused by alcohol.  ¨ Ask your healthcare provider about alcohol abuse programs and support groups such as Alcoholics Anonymous.  ¨ Ask your provider about prescription medicines that can help you stop drinking.  ¨ Tell your provider about the alcohol withdrawal symptoms you have when you stop drinking. This is very important. You may need close medical supervision and special medicines when you stop drinking. This will depend on your alcohol withdrawal history.   · Take your medicines exactly as directed. Dont skip doses.  · Eat a low-fat diet. Ask your provider for menus and other diet information.  · Learn to take your own pulse. Keep a record of your results. Ask your provider which readings mean that you need medical attention.  Ongoing care  · Tell your provider about any medicines you are taking. Some medicines can cause this condition.  · Before starting any new medicine, ask your provider if it will harm your pancreas. This includes any new over-the-counter medicines, vitamins, or herbal supplements.    · Tell your provider if you lose weight without dieting.  · Be aware  of symptoms that may mean your pancreatitis has come back. These symptoms include belly pain, nausea and vomiting, and fever.  · Keep all follow-up appointments with your provider. Problems can often show up later.  Follow-up  Follow up with your healthcare provider, or as advised.  When to call your provider  Call your healthcare provider right away if you have any of the following:  · Fever of 100.4°F (38.0°C) or higher, or as advised by your provider  · Severe pain from your upper belly to your back  · Nausea and vomiting  · Feely dizzy or lightheaded  · Yellowing of your skin or eyes (jaundice)  · Bruises on your belly or back  · Belly swelling and tenderness  · Rapid pulse  · Shallow, fast breathing   Date Last Reviewed: 8/1/2016  © 5633-3957 [employer]. [address] [place] [address], [place] [address]. All rights reserved. This information is not intended as a substitute for professional medical care. Always follow your healthcare professional's instructions.

## 2018-08-21 ENCOUNTER — TREATMENT PLANNING (OUTPATIENT)
Dept: GASTROENTEROLOGY | Facility: CLINIC | Age: 53
End: 2018-08-21

## 2018-08-21 DIAGNOSIS — Z87.19 HISTORY OF PANCREATITIS: Primary | ICD-10-CM

## 2018-08-21 NOTE — Clinical Note
Please inform patient that I received her prior medical records and discussed her case with Dr. Castorena. Dr. Castorena & I recommend getting an MRCP done for further evaluation and then get EUS. Please schedule patient for MRCP to be done prior to EUS.Please verify that the patient does not have a pacemaker/defibrillator, metal implant, cerebral aneurysm or surgical clip, pump, middle or inner ear prosthetic, or nerve or brain stimulator, if so, please notify me so I can adjust the order accordingly. Please ask if patient is claustrophobic, if so, let me know.ThanksKTP

## 2018-08-21 NOTE — PROGRESS NOTES
Reviewed medical records received from [facility], summarized below and in medical & surgical history (endoscopies, etc), copies made & given to nurse to be scanned into system:   Reviewed discharge summary (discharged on 8/4/18) for acute pancreatitis and associated ileus.  Reviewed consult with GI: Dr. Feliz from 8/2/18 (unclear etiology: no gallstones, denies history of alcohol use, no new medications or supplements, triglycerides normal)  Labs reviewed: LFTs were normal throughout all lab results   8/1/18 lipase 67416 (H)   8/2/18 1210 (H), triglycerides 124 WNL   8/3/18 lipase 93 (H)   8/4/18 lipase 30 WNL, amylase 73 WNL  8/1/18 CT scan & 8/2/18 abdominal ultrasound reviewed (same result that was received at patient visit)  Recommendations:  Discussed case with Dr. Castorena. He recommended completing an MRCP and then scheduling EUS to further evaluate pancreas. Orders placed.  Thanks  [facility]

## 2018-08-23 ENCOUNTER — TELEPHONE (OUTPATIENT)
Dept: GASTROENTEROLOGY | Facility: CLINIC | Age: 53
End: 2018-08-23

## 2018-08-23 NOTE — TELEPHONE ENCOUNTER
----- Message from Jason Thomas MD sent at 8/23/2018  9:55 AM CDT -----  Direct EUS is fine. Can be a 30min case. Can be done at [facility] or [place].    ----- Message -----  From: Paulina Larkin MA  Sent: 8/22/2018  10:33 AM  To: Jason Thomas MD    Order was placed for EUS. Please review and advise

## 2018-08-24 ENCOUNTER — TELEPHONE (OUTPATIENT)
Dept: GASTROENTEROLOGY | Facility: CLINIC | Age: 53
End: 2018-08-24

## 2018-08-24 NOTE — TELEPHONE ENCOUNTER
----- Message from Dia Jimenez LPN sent at 8/24/2018 12:01 PM CDT -----  Contact: Pt   Pt does not want the EUS, please cancel. Wants to seek trmnt elsewhere.  ----- Message -----  From: Papo Nieto MA  Sent: 8/24/2018  11:54 AM  To: Raffaele LEMUS Jr. Staff    Pt  Called and donot want the Eus.      Pt can be reached at [phone].    Thanks

## 2018-08-24 NOTE — TELEPHONE ENCOUNTER
----- Message from JIN Darby sent at 8/21/2018  9:40 AM CDT -----  Please inform patient that I received her prior medical records and discussed her case with Dr. Castorena. Dr. Castorena & I recommend getting an MRCP done for further evaluation and then get EUS. Please schedule patient for MRCP to be done prior to EUS.  Please verify that the patient does not have a pacemaker/defibrillator, metal implant, cerebral aneurysm or surgical clip, pump, middle or inner ear prosthetic, or nerve or brain stimulator, if so, please notify me so I can adjust the order accordingly. Please ask if patient is claustrophobic, if so, let me know.    Thanks  MINOO

## 2018-08-24 NOTE — TELEPHONE ENCOUNTER
Spoke with patient. EUS scheduled for 9/17 at 11a. Reviewed prep instructions. Ms Huerta verbalized understanding.

## 2018-08-24 NOTE — TELEPHONE ENCOUNTER
CORRECTION TO TELEPHONE NOTE OF 8/24:Spoke with patient. EUS scheduled for 9/17 at 11a. Reviewed prep instructions. Ms HOLLIDAY verbalized understanding.

## 2018-08-26 ENCOUNTER — TELEPHONE (OUTPATIENT)
Dept: GASTROENTEROLOGY | Facility: CLINIC | Age: 53
End: 2018-08-26

## 2018-08-26 NOTE — TELEPHONE ENCOUNTER
""The associated case was canceled: Patient Refused (Comment Required) (Does not want. Will seek care elsewhere)"    Can we have patient relations contact patient to see if they can help the patient with anything?  Thanks,  KTP  "

## 2018-08-26 NOTE — TELEPHONE ENCOUNTER
"----- Message from Paulina Larkin MA sent at 8/24/2018 12:09 PM CDT -----  Regarding: Cancellation of Order # [id number]  Order number [id number] for the procedure ENDOSCOPIC ULTRASOUND   (UPPER) [GI43] has been canceled by Paulina Larkin MA [[id number]].   This procedure was ordered by JIN Darby [[id number]]   on Aug 21, 2018 for the patient Arlen Rosen [[id number]]. The   reason for cancellation was "None".    This was a future order.  "

## 2021-01-21 ENCOUNTER — OFFICE VISIT (OUTPATIENT)
Dept: FAMILY MEDICINE | Facility: CLINIC | Age: 56
End: 2021-01-21
Payer: COMMERCIAL

## 2021-01-21 VITALS
SYSTOLIC BLOOD PRESSURE: 116 MMHG | HEIGHT: 58 IN | HEART RATE: 61 BPM | BODY MASS INDEX: 21.7 KG/M2 | WEIGHT: 103.38 LBS | DIASTOLIC BLOOD PRESSURE: 78 MMHG

## 2021-01-21 DIAGNOSIS — M50.30 DDD (DEGENERATIVE DISC DISEASE), CERVICAL: Primary | ICD-10-CM

## 2021-01-21 DIAGNOSIS — M47.812 FACET ARTHRITIS, DEGENERATIVE, CERVICAL SPINE: ICD-10-CM

## 2021-01-21 DIAGNOSIS — M79.2 RADICULAR PAIN IN LEFT ARM: ICD-10-CM

## 2021-01-21 DIAGNOSIS — Z00.00 WELLNESS EXAMINATION: ICD-10-CM

## 2021-01-21 DIAGNOSIS — E78.2 MIXED HYPERLIPIDEMIA: ICD-10-CM

## 2021-01-21 DIAGNOSIS — M83.9 VITAMIN D DEFICIENT OSTEOMALACIA: ICD-10-CM

## 2021-01-21 DIAGNOSIS — E04.1 THYROID NODULE: ICD-10-CM

## 2021-01-21 PROCEDURE — 99214 OFFICE O/P EST MOD 30 MIN: CPT | Mod: S$GLB,,, | Performed by: INTERNAL MEDICINE

## 2021-01-21 PROCEDURE — 99999 PR PBB SHADOW E&M-EST. PATIENT-LVL III: ICD-10-PCS | Mod: PBBFAC,,, | Performed by: INTERNAL MEDICINE

## 2021-01-21 PROCEDURE — 99999 PR PBB SHADOW E&M-EST. PATIENT-LVL III: CPT | Mod: PBBFAC,,, | Performed by: INTERNAL MEDICINE

## 2021-01-21 PROCEDURE — 1125F PR PAIN SEVERITY QUANTIFIED, PAIN PRESENT: ICD-10-PCS | Mod: S$GLB,,, | Performed by: INTERNAL MEDICINE

## 2021-01-21 PROCEDURE — 99214 PR OFFICE/OUTPT VISIT, EST, LEVL IV, 30-39 MIN: ICD-10-PCS | Mod: S$GLB,,, | Performed by: INTERNAL MEDICINE

## 2021-01-21 PROCEDURE — 1125F AMNT PAIN NOTED PAIN PRSNT: CPT | Mod: S$GLB,,, | Performed by: INTERNAL MEDICINE

## 2021-01-21 PROCEDURE — 3008F PR BODY MASS INDEX (BMI) DOCUMENTED: ICD-10-PCS | Mod: CPTII,S$GLB,, | Performed by: INTERNAL MEDICINE

## 2021-01-21 PROCEDURE — 3008F BODY MASS INDEX DOCD: CPT | Mod: CPTII,S$GLB,, | Performed by: INTERNAL MEDICINE

## 2021-01-21 RX ORDER — PREDNISONE 10 MG/1
10 TABLET ORAL 2 TIMES DAILY
Qty: 10 TABLET | Refills: 0 | Status: SHIPPED | OUTPATIENT
Start: 2021-01-21 | End: 2021-01-26

## 2021-01-21 RX ORDER — ESTRADIOL 0.5 MG/1
0.5 TABLET ORAL DAILY
COMMUNITY
Start: 2021-01-13

## 2021-01-21 RX ORDER — CYCLOBENZAPRINE HCL 10 MG
10 TABLET ORAL NIGHTLY
Qty: 30 TABLET | Refills: 1 | Status: SHIPPED | OUTPATIENT
Start: 2021-01-21 | End: 2021-01-31

## 2021-01-29 LAB
25(OH)D3 SERPL-MCNC: 34 NG/ML (ref 30–100)
ALBUMIN SERPL-MCNC: 4.3 G/DL (ref 3.6–5.1)
ALBUMIN/GLOB SERPL: 2 (CALC) (ref 1–2.5)
ALP SERPL-CCNC: 72 U/L (ref 37–153)
ALT SERPL-CCNC: 13 U/L (ref 6–29)
AST SERPL-CCNC: 16 U/L (ref 10–35)
BILIRUB SERPL-MCNC: 0.9 MG/DL (ref 0.2–1.2)
BUN SERPL-MCNC: 16 MG/DL (ref 7–25)
BUN/CREAT SERPL: NORMAL (CALC) (ref 6–22)
CALCIUM SERPL-MCNC: 9.6 MG/DL (ref 8.6–10.4)
CHLORIDE SERPL-SCNC: 101 MMOL/L (ref 98–110)
CHOLEST SERPL-MCNC: 229 MG/DL
CHOLEST/HDLC SERPL: 3.8 (CALC)
CO2 SERPL-SCNC: 29 MMOL/L (ref 20–32)
CREAT SERPL-MCNC: 0.78 MG/DL (ref 0.5–1.05)
GFRSERPLBLD MDRD-ARVRAT: 86 ML/MIN/1.73M2
GLOBULIN SER CALC-MCNC: 2.2 G/DL (CALC) (ref 1.9–3.7)
GLUCOSE SERPL-MCNC: 73 MG/DL (ref 65–99)
HDLC SERPL-MCNC: 60 MG/DL
LDLC SERPL CALC-MCNC: 129 MG/DL (CALC)
NONHDLC SERPL-MCNC: 169 MG/DL (CALC)
POTASSIUM SERPL-SCNC: 3.9 MMOL/L (ref 3.5–5.3)
PROT SERPL-MCNC: 6.5 G/DL (ref 6.1–8.1)
SODIUM SERPL-SCNC: 140 MMOL/L (ref 135–146)
TRIGL SERPL-MCNC: 259 MG/DL

## 2021-07-22 ENCOUNTER — OFFICE VISIT (OUTPATIENT)
Dept: FAMILY MEDICINE | Facility: CLINIC | Age: 56
End: 2021-07-22
Payer: COMMERCIAL

## 2021-07-22 VITALS
DIASTOLIC BLOOD PRESSURE: 76 MMHG | BODY MASS INDEX: 20.41 KG/M2 | HEIGHT: 58 IN | WEIGHT: 97.25 LBS | SYSTOLIC BLOOD PRESSURE: 114 MMHG

## 2021-07-22 DIAGNOSIS — M48.02 CERVICAL STENOSIS OF SPINE: ICD-10-CM

## 2021-07-22 DIAGNOSIS — M50.30 DDD (DEGENERATIVE DISC DISEASE), CERVICAL: ICD-10-CM

## 2021-07-22 DIAGNOSIS — M79.2 RADICULAR PAIN IN LEFT ARM: ICD-10-CM

## 2021-07-22 DIAGNOSIS — M54.10 RADICULAR PAIN OF UPPER EXTREMITY: ICD-10-CM

## 2021-07-22 DIAGNOSIS — Z87.19 HISTORY OF PANCREATITIS: ICD-10-CM

## 2021-07-22 DIAGNOSIS — M54.12 RADICULOPATHY, CERVICAL REGION: ICD-10-CM

## 2021-07-22 DIAGNOSIS — E78.2 MIXED HYPERLIPIDEMIA: ICD-10-CM

## 2021-07-22 DIAGNOSIS — E55.9 VITAMIN D DEFICIENCY: ICD-10-CM

## 2021-07-22 DIAGNOSIS — Z00.00 WELLNESS EXAMINATION: Primary | ICD-10-CM

## 2021-07-22 PROCEDURE — 99396 PR PREVENTIVE VISIT,EST,40-64: ICD-10-PCS | Mod: 25,S$GLB,, | Performed by: INTERNAL MEDICINE

## 2021-07-22 PROCEDURE — 1159F MED LIST DOCD IN RCRD: CPT | Mod: CPTII,S$GLB,, | Performed by: INTERNAL MEDICINE

## 2021-07-22 PROCEDURE — 1126F PR PAIN SEVERITY QUANTIFIED, NO PAIN PRESENT: ICD-10-PCS | Mod: CPTII,S$GLB,, | Performed by: INTERNAL MEDICINE

## 2021-07-22 PROCEDURE — 90715 TDAP VACCINE 7 YRS/> IM: CPT | Mod: S$GLB,,, | Performed by: INTERNAL MEDICINE

## 2021-07-22 PROCEDURE — 99999 PR PBB SHADOW E&M-EST. PATIENT-LVL IV: ICD-10-PCS | Mod: PBBFAC,,, | Performed by: INTERNAL MEDICINE

## 2021-07-22 PROCEDURE — 1160F PR REVIEW ALL MEDS BY PRESCRIBER/CLIN PHARMACIST DOCUMENTED: ICD-10-PCS | Mod: CPTII,S$GLB,, | Performed by: INTERNAL MEDICINE

## 2021-07-22 PROCEDURE — 99999 PR PBB SHADOW E&M-EST. PATIENT-LVL IV: CPT | Mod: PBBFAC,,, | Performed by: INTERNAL MEDICINE

## 2021-07-22 PROCEDURE — 90715 TDAP VACCINE GREATER THAN OR EQUAL TO 7YO IM: ICD-10-PCS | Mod: S$GLB,,, | Performed by: INTERNAL MEDICINE

## 2021-07-22 PROCEDURE — 3008F PR BODY MASS INDEX (BMI) DOCUMENTED: ICD-10-PCS | Mod: CPTII,S$GLB,, | Performed by: INTERNAL MEDICINE

## 2021-07-22 PROCEDURE — 3074F PR MOST RECENT SYSTOLIC BLOOD PRESSURE < 130 MM HG: ICD-10-PCS | Mod: CPTII,S$GLB,, | Performed by: INTERNAL MEDICINE

## 2021-07-22 PROCEDURE — 3078F DIAST BP <80 MM HG: CPT | Mod: CPTII,S$GLB,, | Performed by: INTERNAL MEDICINE

## 2021-07-22 PROCEDURE — 1160F RVW MEDS BY RX/DR IN RCRD: CPT | Mod: CPTII,S$GLB,, | Performed by: INTERNAL MEDICINE

## 2021-07-22 PROCEDURE — 99396 PREV VISIT EST AGE 40-64: CPT | Mod: 25,S$GLB,, | Performed by: INTERNAL MEDICINE

## 2021-07-22 PROCEDURE — 3078F PR MOST RECENT DIASTOLIC BLOOD PRESSURE < 80 MM HG: ICD-10-PCS | Mod: CPTII,S$GLB,, | Performed by: INTERNAL MEDICINE

## 2021-07-22 PROCEDURE — 1159F PR MEDICATION LIST DOCUMENTED IN MEDICAL RECORD: ICD-10-PCS | Mod: CPTII,S$GLB,, | Performed by: INTERNAL MEDICINE

## 2021-07-22 PROCEDURE — 90471 IMMUNIZATION ADMIN: CPT | Mod: S$GLB,,, | Performed by: INTERNAL MEDICINE

## 2021-07-22 PROCEDURE — 90471 TDAP VACCINE GREATER THAN OR EQUAL TO 7YO IM: ICD-10-PCS | Mod: S$GLB,,, | Performed by: INTERNAL MEDICINE

## 2021-07-22 PROCEDURE — 3008F BODY MASS INDEX DOCD: CPT | Mod: CPTII,S$GLB,, | Performed by: INTERNAL MEDICINE

## 2021-07-22 PROCEDURE — 3074F SYST BP LT 130 MM HG: CPT | Mod: CPTII,S$GLB,, | Performed by: INTERNAL MEDICINE

## 2021-07-22 PROCEDURE — 1126F AMNT PAIN NOTED NONE PRSNT: CPT | Mod: CPTII,S$GLB,, | Performed by: INTERNAL MEDICINE

## 2021-07-22 RX ORDER — VALACYCLOVIR HYDROCHLORIDE 1 G/1
TABLET, FILM COATED ORAL
COMMUNITY
Start: 2021-04-30

## 2021-07-22 RX ORDER — ONDANSETRON HYDROCHLORIDE 8 MG/1
8 TABLET, FILM COATED ORAL 4 TIMES DAILY PRN
Qty: 30 TABLET | Refills: 0 | Status: SHIPPED | OUTPATIENT
Start: 2021-07-22

## 2021-07-22 RX ORDER — ONDANSETRON HYDROCHLORIDE 8 MG/1
TABLET, FILM COATED ORAL 4 TIMES DAILY PRN
COMMUNITY
Start: 2021-03-08 | End: 2021-07-22 | Stop reason: SDUPTHER

## 2021-07-31 PROBLEM — E78.2 MIXED HYPERLIPIDEMIA: Status: ACTIVE | Noted: 2021-07-31

## 2021-07-31 PROBLEM — E55.9 VITAMIN D DEFICIENCY: Status: ACTIVE | Noted: 2021-07-31

## 2021-08-04 LAB
25(OH)D3 SERPL-MCNC: 35 NG/ML (ref 30–100)
ALBUMIN SERPL-MCNC: 4.4 G/DL (ref 3.6–5.1)
ALBUMIN/GLOB SERPL: 1.8 (CALC) (ref 1–2.5)
ALP SERPL-CCNC: 72 U/L (ref 37–153)
ALT SERPL-CCNC: 13 U/L (ref 6–29)
AMYLASE SERPL-CCNC: 41 U/L (ref 21–101)
AST SERPL-CCNC: 19 U/L (ref 10–35)
BASOPHILS # BLD AUTO: 31 CELLS/UL (ref 0–200)
BASOPHILS NFR BLD AUTO: 0.5 %
BILIRUB SERPL-MCNC: 0.7 MG/DL (ref 0.2–1.2)
BUN SERPL-MCNC: 15 MG/DL (ref 7–25)
BUN/CREAT SERPL: NORMAL (CALC) (ref 6–22)
CALCIUM SERPL-MCNC: 10.1 MG/DL (ref 8.6–10.4)
CHLORIDE SERPL-SCNC: 101 MMOL/L (ref 98–110)
CHOLEST SERPL-MCNC: 222 MG/DL
CHOLEST/HDLC SERPL: 3.2 (CALC)
CO2 SERPL-SCNC: 29 MMOL/L (ref 20–32)
CREAT SERPL-MCNC: 0.76 MG/DL (ref 0.5–1.05)
EOSINOPHIL # BLD AUTO: 92 CELLS/UL (ref 15–500)
EOSINOPHIL NFR BLD AUTO: 1.5 %
ERYTHROCYTE [DISTWIDTH] IN BLOOD BY AUTOMATED COUNT: 12.6 % (ref 11–15)
GLOBULIN SER CALC-MCNC: 2.5 G/DL (CALC) (ref 1.9–3.7)
GLUCOSE SERPL-MCNC: 83 MG/DL (ref 65–99)
HCT VFR BLD AUTO: 43 % (ref 35–45)
HDLC SERPL-MCNC: 69 MG/DL
HGB BLD-MCNC: 14.6 G/DL (ref 11.7–15.5)
LDLC SERPL CALC-MCNC: 127 MG/DL (CALC)
LIPASE SERPL-CCNC: 26 U/L (ref 7–60)
LYMPHOCYTES # BLD AUTO: 2599 CELLS/UL (ref 850–3900)
LYMPHOCYTES NFR BLD AUTO: 42.6 %
MCH RBC QN AUTO: 30.7 PG (ref 27–33)
MCHC RBC AUTO-ENTMCNC: 34 G/DL (ref 32–36)
MCV RBC AUTO: 90.5 FL (ref 80–100)
MONOCYTES # BLD AUTO: 311 CELLS/UL (ref 200–950)
MONOCYTES NFR BLD AUTO: 5.1 %
NEUTROPHILS # BLD AUTO: 3068 CELLS/UL (ref 1500–7800)
NEUTROPHILS NFR BLD AUTO: 50.3 %
NONHDLC SERPL-MCNC: 153 MG/DL (CALC)
PLATELET # BLD AUTO: 183 THOUSAND/UL (ref 140–400)
PMV BLD REES-ECKER: 12.4 FL (ref 7.5–12.5)
POTASSIUM SERPL-SCNC: 4.1 MMOL/L (ref 3.5–5.3)
PROT SERPL-MCNC: 6.9 G/DL (ref 6.1–8.1)
RBC # BLD AUTO: 4.75 MILLION/UL (ref 3.8–5.1)
SODIUM SERPL-SCNC: 140 MMOL/L (ref 135–146)
TRIGL SERPL-MCNC: 150 MG/DL
TSH SERPL-ACNC: 1.4 MIU/L
WBC # BLD AUTO: 6.1 THOUSAND/UL (ref 3.8–10.8)

## 2021-08-06 ENCOUNTER — PATIENT OUTREACH (OUTPATIENT)
Dept: ADMINISTRATIVE | Facility: HOSPITAL | Age: 56
End: 2021-08-06

## 2021-08-11 ENCOUNTER — HOSPITAL ENCOUNTER (OUTPATIENT)
Dept: RADIOLOGY | Facility: HOSPITAL | Age: 56
Discharge: HOME | End: 2021-08-11
Attending: INTERNAL MEDICINE
Payer: COMMERCIAL

## 2021-08-11 DIAGNOSIS — M48.02 CERVICAL STENOSIS OF SPINE: ICD-10-CM

## 2021-08-11 DIAGNOSIS — M54.12 RADICULOPATHY, CERVICAL REGION: ICD-10-CM

## 2021-08-11 PROCEDURE — 72141 MRI NECK SPINE W/O DYE: CPT | Mod: TC,PO

## 2021-08-11 PROCEDURE — 72141 MRI CERVICAL SPINE WITHOUT CONTRAST: ICD-10-PCS | Mod: 26,,, | Performed by: RADIOLOGY

## 2021-08-11 PROCEDURE — 72141 MRI NECK SPINE W/O DYE: CPT | Mod: 26,,, | Performed by: RADIOLOGY

## 2021-08-20 ENCOUNTER — PATIENT MESSAGE (OUTPATIENT)
Dept: FAMILY MEDICINE | Facility: CLINIC | Age: 56
End: 2021-08-20

## 2021-08-21 ENCOUNTER — PATIENT OUTREACH (OUTPATIENT)
Dept: ADMINISTRATIVE | Facility: HOSPITAL | Age: 56
End: 2021-08-21

## 2021-08-25 ENCOUNTER — PATIENT OUTREACH (OUTPATIENT)
Dept: ADMINISTRATIVE | Facility: OTHER | Age: 56
End: 2021-08-25

## 2021-08-26 ENCOUNTER — OFFICE VISIT (OUTPATIENT)
Dept: NEUROSURGERY | Facility: CLINIC | Age: 56
End: 2021-08-26
Payer: COMMERCIAL

## 2021-08-26 VITALS
SYSTOLIC BLOOD PRESSURE: 131 MMHG | WEIGHT: 97.25 LBS | BODY MASS INDEX: 20.41 KG/M2 | HEIGHT: 58 IN | DIASTOLIC BLOOD PRESSURE: 75 MMHG | HEART RATE: 57 BPM

## 2021-08-26 DIAGNOSIS — M85.89 OSTEOPENIA OF MULTIPLE SITES: Primary | ICD-10-CM

## 2021-08-26 DIAGNOSIS — M43.12 SPONDYLOLISTHESIS OF CERVICAL REGION: ICD-10-CM

## 2021-08-26 DIAGNOSIS — M50.30 DDD (DEGENERATIVE DISC DISEASE), CERVICAL: ICD-10-CM

## 2021-08-26 DIAGNOSIS — M54.10 RADICULAR PAIN OF UPPER EXTREMITY: ICD-10-CM

## 2021-08-26 PROCEDURE — 1159F MED LIST DOCD IN RCRD: CPT | Mod: CPTII,S$GLB,, | Performed by: STUDENT IN AN ORGANIZED HEALTH CARE EDUCATION/TRAINING PROGRAM

## 2021-08-26 PROCEDURE — 99204 OFFICE O/P NEW MOD 45 MIN: CPT | Mod: S$GLB,,, | Performed by: STUDENT IN AN ORGANIZED HEALTH CARE EDUCATION/TRAINING PROGRAM

## 2021-08-26 PROCEDURE — 3078F DIAST BP <80 MM HG: CPT | Mod: CPTII,S$GLB,, | Performed by: STUDENT IN AN ORGANIZED HEALTH CARE EDUCATION/TRAINING PROGRAM

## 2021-08-26 PROCEDURE — 99204 PR OFFICE/OUTPT VISIT, NEW, LEVL IV, 45-59 MIN: ICD-10-PCS | Mod: S$GLB,,, | Performed by: STUDENT IN AN ORGANIZED HEALTH CARE EDUCATION/TRAINING PROGRAM

## 2021-08-26 PROCEDURE — 1125F AMNT PAIN NOTED PAIN PRSNT: CPT | Mod: CPTII,S$GLB,, | Performed by: STUDENT IN AN ORGANIZED HEALTH CARE EDUCATION/TRAINING PROGRAM

## 2021-08-26 PROCEDURE — 3078F PR MOST RECENT DIASTOLIC BLOOD PRESSURE < 80 MM HG: ICD-10-PCS | Mod: CPTII,S$GLB,, | Performed by: STUDENT IN AN ORGANIZED HEALTH CARE EDUCATION/TRAINING PROGRAM

## 2021-08-26 PROCEDURE — 1125F PR PAIN SEVERITY QUANTIFIED, PAIN PRESENT: ICD-10-PCS | Mod: CPTII,S$GLB,, | Performed by: STUDENT IN AN ORGANIZED HEALTH CARE EDUCATION/TRAINING PROGRAM

## 2021-08-26 PROCEDURE — 99999 PR PBB SHADOW E&M-EST. PATIENT-LVL V: ICD-10-PCS | Mod: PBBFAC,,, | Performed by: STUDENT IN AN ORGANIZED HEALTH CARE EDUCATION/TRAINING PROGRAM

## 2021-08-26 PROCEDURE — 3008F BODY MASS INDEX DOCD: CPT | Mod: CPTII,S$GLB,, | Performed by: STUDENT IN AN ORGANIZED HEALTH CARE EDUCATION/TRAINING PROGRAM

## 2021-08-26 PROCEDURE — 99999 PR PBB SHADOW E&M-EST. PATIENT-LVL V: CPT | Mod: PBBFAC,,, | Performed by: STUDENT IN AN ORGANIZED HEALTH CARE EDUCATION/TRAINING PROGRAM

## 2021-08-26 PROCEDURE — 3008F PR BODY MASS INDEX (BMI) DOCUMENTED: ICD-10-PCS | Mod: CPTII,S$GLB,, | Performed by: STUDENT IN AN ORGANIZED HEALTH CARE EDUCATION/TRAINING PROGRAM

## 2021-08-26 PROCEDURE — 3075F PR MOST RECENT SYSTOLIC BLOOD PRESS GE 130-139MM HG: ICD-10-PCS | Mod: CPTII,S$GLB,, | Performed by: STUDENT IN AN ORGANIZED HEALTH CARE EDUCATION/TRAINING PROGRAM

## 2021-08-26 PROCEDURE — 1159F PR MEDICATION LIST DOCUMENTED IN MEDICAL RECORD: ICD-10-PCS | Mod: CPTII,S$GLB,, | Performed by: STUDENT IN AN ORGANIZED HEALTH CARE EDUCATION/TRAINING PROGRAM

## 2021-08-26 PROCEDURE — 3075F SYST BP GE 130 - 139MM HG: CPT | Mod: CPTII,S$GLB,, | Performed by: STUDENT IN AN ORGANIZED HEALTH CARE EDUCATION/TRAINING PROGRAM

## 2021-09-02 ENCOUNTER — TELEPHONE (OUTPATIENT)
Dept: FAMILY MEDICINE | Facility: CLINIC | Age: 56
End: 2021-09-02

## 2022-01-12 DIAGNOSIS — Z12.31 OTHER SCREENING MAMMOGRAM: ICD-10-CM

## 2022-03-04 NOTE — TELEPHONE ENCOUNTER
Updated on labs    discussed hyperlipidemia--recommended medication--patient would like to think about it over the weekend, will call back next week to discuss     Advised to start otc vitamin d3 1000units daily   
No abnormal movements

## 2022-05-31 ENCOUNTER — PATIENT MESSAGE (OUTPATIENT)
Dept: ADMINISTRATIVE | Facility: HOSPITAL | Age: 57
End: 2022-05-31
Payer: COMMERCIAL

## 2022-08-24 ENCOUNTER — PATIENT MESSAGE (OUTPATIENT)
Dept: ADMINISTRATIVE | Facility: HOSPITAL | Age: 57
End: 2022-08-24
Payer: COMMERCIAL

## 2022-10-10 ENCOUNTER — PATIENT MESSAGE (OUTPATIENT)
Dept: ADMINISTRATIVE | Facility: HOSPITAL | Age: 57
End: 2022-10-10
Payer: COMMERCIAL

## 2022-12-06 ENCOUNTER — PATIENT OUTREACH (OUTPATIENT)
Dept: ADMINISTRATIVE | Facility: HOSPITAL | Age: 57
End: 2022-12-06
Payer: COMMERCIAL

## 2022-12-06 ENCOUNTER — PATIENT MESSAGE (OUTPATIENT)
Dept: ADMINISTRATIVE | Facility: HOSPITAL | Age: 57
End: 2022-12-06
Payer: COMMERCIAL

## 2022-12-06 NOTE — PROGRESS NOTES
REVIEWED DIS 12/06/2022    LEFT MESSAGE TO RETURN CALL      Outreach to patient in reference to SCHEDULING A MAMMOGRAM EXAM.     Chart review completed:   Care Everywhere and Media reports - updates requested and reviewed.

## 2023-01-17 ENCOUNTER — PATIENT MESSAGE (OUTPATIENT)
Dept: ADMINISTRATIVE | Facility: HOSPITAL | Age: 58
End: 2023-01-17
Payer: COMMERCIAL